# Patient Record
Sex: MALE | Race: WHITE | HISPANIC OR LATINO | Employment: FULL TIME | URBAN - METROPOLITAN AREA
[De-identification: names, ages, dates, MRNs, and addresses within clinical notes are randomized per-mention and may not be internally consistent; named-entity substitution may affect disease eponyms.]

---

## 2024-07-03 ENCOUNTER — HOSPITAL ENCOUNTER (EMERGENCY)
Facility: HOSPITAL | Age: 24
Discharge: HOME | End: 2024-07-03
Attending: STUDENT IN AN ORGANIZED HEALTH CARE EDUCATION/TRAINING PROGRAM

## 2024-07-03 VITALS
HEART RATE: 116 BPM | DIASTOLIC BLOOD PRESSURE: 80 MMHG | WEIGHT: 260 LBS | SYSTOLIC BLOOD PRESSURE: 142 MMHG | HEIGHT: 70 IN | TEMPERATURE: 102 F | RESPIRATION RATE: 18 BRPM | BODY MASS INDEX: 37.22 KG/M2 | OXYGEN SATURATION: 96 %

## 2024-07-03 DIAGNOSIS — J02.0 STREP PHARYNGITIS: Primary | ICD-10-CM

## 2024-07-03 LAB
CTP QC/QA: YES
CTP QC/QA: YES
MOLECULAR STREP A: POSITIVE
SARS-COV-2 RDRP RESP QL NAA+PROBE: NEGATIVE

## 2024-07-03 PROCEDURE — 99284 EMERGENCY DEPT VISIT MOD MDM: CPT

## 2024-07-03 PROCEDURE — 87651 STREP A DNA AMP PROBE: CPT

## 2024-07-03 PROCEDURE — 25000003 PHARM REV CODE 250

## 2024-07-03 PROCEDURE — 87635 SARS-COV-2 COVID-19 AMP PRB: CPT

## 2024-07-03 RX ORDER — AMOXICILLIN AND CLAVULANATE POTASSIUM 875; 125 MG/1; MG/1
1 TABLET, FILM COATED ORAL 2 TIMES DAILY
Qty: 20 TABLET | Refills: 0 | Status: SHIPPED | OUTPATIENT
Start: 2024-07-03 | End: 2024-07-13

## 2024-07-03 RX ORDER — AMOXICILLIN AND CLAVULANATE POTASSIUM 875; 125 MG/1; MG/1
1 TABLET, FILM COATED ORAL
Status: COMPLETED | OUTPATIENT
Start: 2024-07-03 | End: 2024-07-03

## 2024-07-03 RX ORDER — IBUPROFEN 600 MG/1
600 TABLET ORAL
Status: COMPLETED | OUTPATIENT
Start: 2024-07-03 | End: 2024-07-03

## 2024-07-03 RX ORDER — ACETAMINOPHEN 500 MG
1000 TABLET ORAL
Status: COMPLETED | OUTPATIENT
Start: 2024-07-03 | End: 2024-07-03

## 2024-07-03 RX ORDER — PHENOL 1.4 %
AEROSOL, SPRAY (ML) MUCOUS MEMBRANE
Qty: 177 ML | Refills: 0 | Status: SHIPPED | OUTPATIENT
Start: 2024-07-03

## 2024-07-03 RX ORDER — GUAIFENESIN AND DEXTROMETHORPHAN HYDROBROMIDE 10; 100 MG/5ML; MG/5ML
5 SYRUP ORAL EVERY 6 HOURS
Qty: 473 ML | Refills: 0 | Status: SHIPPED | OUTPATIENT
Start: 2024-07-03 | End: 2024-07-13

## 2024-07-03 RX ORDER — LIDOCAINE 50 MG/G
1 PATCH TOPICAL DAILY
Qty: 15 PATCH | Refills: 0 | Status: SHIPPED | OUTPATIENT
Start: 2024-07-03

## 2024-07-03 RX ORDER — ACETAMINOPHEN 500 MG
500 TABLET ORAL EVERY 6 HOURS PRN
Qty: 30 TABLET | Refills: 0 | Status: SHIPPED | OUTPATIENT
Start: 2024-07-03

## 2024-07-03 RX ORDER — IBUPROFEN 600 MG/1
600 TABLET ORAL EVERY 6 HOURS PRN
Qty: 20 TABLET | Refills: 0 | Status: SHIPPED | OUTPATIENT
Start: 2024-07-03

## 2024-07-03 RX ADMIN — AMOXICILLIN AND CLAVULANATE POTASSIUM 1 TABLET: 875; 125 TABLET, FILM COATED ORAL at 05:07

## 2024-07-03 RX ADMIN — IBUPROFEN 600 MG: 600 TABLET ORAL at 04:07

## 2024-07-03 RX ADMIN — ACETAMINOPHEN 1000 MG: 500 TABLET ORAL at 04:07

## 2024-07-03 NOTE — DISCHARGE INSTRUCTIONS
Thank you for coming to our Emergency Department today. It is important to remember that some problems or medical conditions are difficult to diagnose and may not be found or addressed during your Emergency Department visit.  These conditions often start with non-specific symptoms and can only be diagnosed on follow up visits with your primary care physician or specialist when the symptoms continue or change. Please remember that all medical conditions can change, and we cannot predict how you will be feeling tomorrow or the next day. Return to the ER with any questions/concerns, new/concerning symptoms including fever, chest pain, shortness of breath, loss of consciousness, dizziness, weakness, worsening symptoms, failure to improve, or any other concerns. Also, please follow up with your Primary Care Physician and/or Pediatrician in the next 1-2 days to review your ED visit in entirety and for re-evaluation.   Be sure to follow up with your primary care doctor and review all labs/imaging/tests that were performed during your ER visit with them. It is very common for us to identify non-emergent incidental findings which must be followed up with your primary care physician.  Some labs/imaging/tests may be outside of the normal range, and require non-emergent follow-up and/or further investigation/treatment/procedures/testing to help diagnose/exclude/prevent complications or other potentially serious medical conditions. Some abnormalities may not have been discussed or addressed during your ER visit. Some lab results may not return during your ER visit but can be accessible by downloading the free [facility] indu or by visiting [email or web address] . It is important for you to review all labs/imaging/tests which are outside of the normal range with your physician.  An ER visit does not replace a primary care visit, and many screening tests or follow-up tests cannot be ordered by an ER doctor or performed by  the ER. Some tests may even require pre-approval.  If you do not have a primary care doctor, you may contact the one listed on your discharge paperwork or you may also call the [facility] Appointment Desk at [phone] , or [address] [place] [address] at  [phone] to schedule an appointment, or establish care with a primary care doctor or even a specialist and to obtain information about local resources. It is important to your health that you have a primary care doctor.  Please take all medications as directed. We have done our best to select a medication for you that will treat your condition however, all medications may potentially have side-effects and it is impossible to predict which medications may give you side-effects or what those side-effects (if any) those medications may give you.  If you feel that you are having a negative effect or side-effect of any medication you should stop taking those medications immediately and seek medical attention. If you feel that you are having a life-threatening reaction call 911.  Do not drive, swim, climb to height, take a bath, operate heavy machinery, drink alcohol or take potentially sedating medications, sign any legal documents or make any important decisions for 24 hours if you have received any pain medications, sedatives or mood altering drugs during your ER visit or within 24 hours of taking them if they have been prescribed to you.   You can find additional resources for Dentists, hearing aids, durable medical equipment, low cost pharmacies and other resources at [email or web address]  Patient agrees with this plan. Discussed with her strict return precautions, they verbalized understanding. Patient is stable for discharge.   § Please take all medication as prescribed.

## 2024-07-03 NOTE — Clinical Note
"Dmitriy"Carlos Nieves was seen and treated in our emergency department on 7/3/2024.  He may return to work on 07/07/2024.  Patient tested + for Strep Pharyngitis. He may return to work on the specified date as noted on this document. Patient may return soon if he is without a fever for over 24 hours without the use of Ibuprofen or Tylenol.      If you have any questions or concerns, please don't hesitate to call.      Benigno Piña PA-C"

## 2024-07-03 NOTE — ED PROVIDER NOTES
Encounter Date: 7/3/2024    SCRIBE #1 NOTE: I, Shonda Toth, am scribing for, and in the presence of,  Benigno Piña PA-C. I have scribed the following portions of the note - Other sections scribed: HPI,ROS.       History     Chief Complaint   Patient presents with    Fever     Pt c/o fever, chills, sore throat and fatigue starting today.      Dmitriy Nieves is a 23 y.o. male, with a no prior PMHx, who presents to the ED with complaint of sore throat with associated fever, nausea, vomiting, generalized myalgias, dizziness, fatigue, back pain, and headache that began this AM. Patient denies any sick contact. Patient notes he works a lot. Attempted treatment with tylenol at 0700AM PTA. No other exacerbating or alleviating factors. Denies cough, congestion, rhinorrhea, ear pain bilaterally, chest pain, SOB, abdominal pain, or other associated symptoms.       The history is provided by the patient. No  was used.     Review of patient's allergies indicates:  No Known Allergies  No past medical history on file.  No past surgical history on file.  No family history on file.     Review of Systems   Constitutional:  Positive for fatigue and fever. Negative for chills and diaphoresis.   HENT:  Positive for sore throat. Negative for congestion, ear discharge, ear pain, rhinorrhea and trouble swallowing.    Eyes:  Negative for photophobia, redness and visual disturbance.   Respiratory:  Negative for cough and shortness of breath.    Cardiovascular:  Negative for chest pain, palpitations and leg swelling.   Gastrointestinal:  Positive for nausea and vomiting. Negative for abdominal pain and diarrhea.   Genitourinary:  Negative for difficulty urinating, dysuria, flank pain, frequency and hematuria.   Musculoskeletal:  Positive for back pain and myalgias (generalized). Negative for arthralgias, neck pain and neck stiffness.   Skin:  Negative for pallor and rash.   Neurological:  Positive for dizziness and  headaches. Negative for weakness, light-headedness and numbness.   Hematological:  Does not bruise/bleed easily.       Physical Exam     Initial Vitals [07/03/24 1619]   BP Pulse Resp Temp SpO2   (!) 185/99 (!) 124 18 (!) 102.6 °F (39.2 °C) 97 %      MAP       --         Physical Exam    Nursing note and vitals reviewed.  Constitutional: He appears well-developed and well-nourished. He is not diaphoretic. He does not appear ill. No distress.   HENT:   Head: Normocephalic and atraumatic.   Right Ear: Tympanic membrane, external ear and ear canal normal.   Left Ear: Tympanic membrane, external ear and ear canal normal.   Nose: Mucosal edema and rhinorrhea present. Right sinus exhibits no maxillary sinus tenderness and no frontal sinus tenderness. Left sinus exhibits no maxillary sinus tenderness and no frontal sinus tenderness.   Mouth/Throat: Uvula is midline and mucous membranes are normal. No trismus in the jaw. No uvula swelling. Oropharyngeal exudate and posterior oropharyngeal erythema present. No posterior oropharyngeal edema or tonsillar abscesses.   Postnasal drip noted.  Uvula midline without any erythema or swelling.  No submandibular or sublingual swelling or erythema.  No trismus.  Normal jaw occlusion.  No evidence of tonsillar abscess.  No muffled voice.    Patient is tolerating secretions without difficulty.   Patient is speaking in full sentences on exam without difficulty.  There is no postauricular swelling, or overlying erythema or tenderness to palpation over mastoids bilaterally.   Eyes: Conjunctivae, EOM and lids are normal. Pupils are equal, round, and reactive to light. Right eye exhibits no discharge. Left eye exhibits no discharge. No scleral icterus.   Neck: Trachea normal. Neck supple.   Normal range of motion.   Full passive range of motion without pain.     Cardiovascular:  Normal rate, regular rhythm, normal heart sounds and normal pulses.     Exam reveals no distant heart sounds and no  friction rub.       Pulmonary/Chest: Effort normal and breath sounds normal. No respiratory distress.   Abdominal: Abdomen is soft. Bowel sounds are normal. He exhibits no distension and no pulsatile midline mass. There is no abdominal tenderness.   No right CVA tenderness.  No left CVA tenderness. There is no rebound and no guarding.   Musculoskeletal:         General: Normal range of motion.      Right shoulder: Normal.      Left shoulder: Normal.      Right elbow: Normal.      Left elbow: Normal.      Right wrist: Normal.      Left wrist: Normal.      Right hand: Normal.      Left hand: Normal.      Cervical back: Normal, full passive range of motion without pain, normal range of motion and neck supple. No edema, erythema or rigidity. No spinous process tenderness or muscular tenderness. Normal range of motion.      Thoracic back: Normal.      Lumbar back: Normal.      Right hip: Normal.      Left hip: Normal.      Right knee: Normal.      Left knee: Normal.      Right lower leg: Normal.      Left lower leg: Normal.      Right ankle: Normal.      Left ankle: Normal.      Right foot: Normal.      Left foot: Normal.     Lymphadenopathy:        Head (right side): No submental, no submandibular, no tonsillar, no preauricular, no posterior auricular and no occipital adenopathy present.        Head (left side): No submental, no submandibular, no tonsillar, no preauricular, no posterior auricular and no occipital adenopathy present.     He has cervical adenopathy.   Neurological: He is alert and oriented to person, place, and time. He has normal strength. No cranial nerve deficit or sensory deficit. Gait normal.   Skin: Skin is warm and dry. Capillary refill takes less than 2 seconds. No bruising, no ecchymosis and no rash noted. No erythema.   Psychiatric: He has a normal mood and affect. His speech is normal and behavior is normal. Thought content normal.         ED Course   Procedures  Labs Reviewed   POCT STREP A  MOLECULAR - Abnormal; Notable for the following components:       Result Value    Molecular Strep A, POC Positive (*)     All other components within normal limits   SARS-COV-2 RDRP GENE          Imaging Results    None          Medications   acetaminophen tablet 1,000 mg (1,000 mg Oral Given 7/3/24 1634)   ibuprofen tablet 600 mg (600 mg Oral Given 7/3/24 1634)   amoxicillin-clavulanate 875-125mg per tablet 1 tablet (1 tablet Oral Given 7/3/24 1703)     Medical Decision Making  Dmitriy Nieves is a 23 y.o. male, with a no prior PMHx, who presents to the ED with complaint of sore throat with associated fever, nausea, vomiting, generalized myalgias, dizziness, fatigue, back pain, and headache that began this AM. Patient denies any sick contact. Patient notes he works a lot. Attempted treatment with tylenol at 0700AM PTA. No other exacerbating or alleviating factors. Denies cough, congestion, rhinorrhea, ear pain bilaterally, chest pain, SOB, abdominal pain, or other associated symptoms.   Patient's chart and medical history reviewed.  Patient's vitals reviewed.  They are febrile and Tylenol and ibuprofen given will re-evaluate, no respiratory distress, nontoxic-appearing in the ED.  Patient is a well-appearing 23 y.o. male.  Lung sounds are clear and not consistent with pneumonia. There is no neck pain or limited ROM to suggest retropharyngeal abscess or meningitis. Tolerating PO, non-toxic appearing, no hypoxia. The patient remained comfortable and stable during their visit in the ED.  Details of ED course documented in ED workup.     Differential Diagnosis is considered, but is not limited to: COVID, Flu, Strep throat, Viral URI, pneumonia, acute otitis media, otitis externa, mastoiditis, sinusitis, viral gastroenteritis, measles, roseola.  Also considered but less likely:  PTA/RPA: no hot potato voice, no uvular deviation, no pain with passive neck flexion.  Esophageal rupture: No history of dysphagia.  Unlikely  deep space infection/Aaron's, no submandibular or sublingual erythema or swelling.  Low suspicion for CNS infection/meningitis: no pain with passive neck flexion, no neck rigidity/stiffness, no neck tenderness, negative Brudzinski.  Unlikely EBV as no splenomegaly.     Strep test Positive.  Augmentin given in the emergency department we will discharged home with a 10 day course.  All historical, clinical, and laboratory findings reviewed. Patient with constellation of symptoms most consistent with strep throat/pharyngitis. There are no concerning features on physical exam to suggest an emergent or life threatening condition or an invasive bacterial infection, including, but not limited to the conditions noted above. No further intervention is indicated at this time. The patient is at low risk for an emergent/life threatening medical condition at this time, and I am of the belief that that it is safe to discharge the patient from the emergency department.     Patient/family instructed to follow up with PCP/Pediatrician in 1-2 days for recheck of today's complaints. Patient/family has been counseled regarding the need for follow-up as well as the indications to return to the emergency room should new, worsening, continued or worrisome developments. Discharge and follow-up instructions discussed with the patient/family who expressed understanding and willingness to comply with recommendations. Patient discharged from the emergency department in stable condition, in no acute distress.  I discussed with the patient/family the diagnosis, treatment plan, indications for return to the emergency department, and for expected follow-up. The patient/family verbalized an understanding. The patient/family is asked if there are any questions or concerns. We discuss the case, until all issues are addressed to the patient/family's satisfaction. Patient/family understands and is agreeable to the plan.   CLEMENT RAM,  EPIFANIO    DISCLAIMER: This note was prepared with [employer] voice recognition transcription software. Garbled syntax, mangled pronouns, and other bizarre constructions may be attributed to that software system.        Amount and/or Complexity of Data Reviewed  Labs: ordered. Decision-making details documented in ED Course.    Risk  OTC drugs.  Prescription drug management.            Scribe Attestation:   Scribe #1: I performed the above scribed service and the documentation accurately describes the services I performed. I attest to the accuracy of the note.        ED Course as of 07/03/24 1707   Wed Jul 03, 2024   1641 Molecular Strep A, POC(!): Positive [AF]   1641 Temp(!): 102.6 °F (39.2 °C) [AF]   1703 Temp(!): 101.7 °F (38.7 °C)  Lowering 30 min after medication administration   [AF]      ED Course User Index  [AF] Benigno Piña PA-C I, Alain David Flexer, PA-C, personally performed the services described in this documentation. All medical record entries made by the scribe were at my direction and in my presence. I have reviewed the chart and agree that the record reflects my personal performance and is accurate and complete.    Clinical Impression:  Final diagnoses:  [J02.0] Strep pharyngitis (Primary)          ED Disposition Condition    Discharge Stable          ED Prescriptions       Medication Sig Dispense Start Date End Date Auth. Provider    amoxicillin-clavulanate 875-125mg (AUGMENTIN) 875-125 mg per tablet Take 1 tablet by mouth 2 (two) times daily. for 10 days 20 tablet 7/3/2024 7/13/2024 Benigno Piña PA-C    dextromethorphan-guaiFENesin  mg/5 ml (ROBITUSSIN-DM)  mg/5 mL liquid Take 5 mLs by mouth every 6 (six) hours. for 10 days 473 mL 7/3/2024 7/13/2024 Benigno Piña PA-C    acetaminophen (TYLENOL) 500 MG tablet Take 1 tablet (500 mg total) by mouth every 6 (six) hours as needed for Pain. 30 tablet 7/3/2024 -- Benigno Piña PA-C    phenoL (CHLORASEPTIC  THROAT SPRAY) 1.4 % SprA by Mucous Membrane route every 2 (two) hours. 177 mL 7/3/2024 -- Benigno Piña PA-C    ibuprofen (ADVIL,MOTRIN) 600 MG tablet Take 1 tablet (600 mg total) by mouth every 6 (six) hours as needed for Pain. 20 tablet 7/3/2024 -- Benigno Piña PA-C    LIDOcaine (LIDODERM) 5 % Place 1 patch onto the skin once daily. Apply patch for 12 hours and then leave off for 12 hours 15 patch 7/3/2024 -- Benigno Piña PA-C          Follow-up Information       Follow up With Specialties Details Why Contact Info    [facility] -  Schedule an appointment as soon as possible for a visit in 1 day for follow up if you do not currently have a  [facility]  [place] [address]  [phone]      Your primary care physician  Schedule an appointment as soon as possible for a visit in 1 day for follow up              Benigno Piña PA-C  07/03/24 [id number]